# Patient Record
Sex: FEMALE | Race: WHITE | URBAN - NONMETROPOLITAN AREA
[De-identification: names, ages, dates, MRNs, and addresses within clinical notes are randomized per-mention and may not be internally consistent; named-entity substitution may affect disease eponyms.]

---

## 2018-10-17 ENCOUNTER — OFFICE VISIT (OUTPATIENT)
Dept: FAMILY MEDICINE CLINIC | Age: 48
End: 2018-10-17
Payer: COMMERCIAL

## 2018-10-17 VITALS
BODY MASS INDEX: 35.58 KG/M2 | SYSTOLIC BLOOD PRESSURE: 126 MMHG | WEIGHT: 208.4 LBS | TEMPERATURE: 98.3 F | OXYGEN SATURATION: 98 % | HEART RATE: 75 BPM | HEIGHT: 64 IN | DIASTOLIC BLOOD PRESSURE: 70 MMHG | RESPIRATION RATE: 10 BRPM

## 2018-10-17 DIAGNOSIS — Z91.09 ENVIRONMENTAL ALLERGIES: ICD-10-CM

## 2018-10-17 DIAGNOSIS — F41.9 ANXIETY: ICD-10-CM

## 2018-10-17 DIAGNOSIS — R41.3 MEMORY DIFFICULTIES: ICD-10-CM

## 2018-10-17 DIAGNOSIS — R68.89 COLD INTOLERANCE: ICD-10-CM

## 2018-10-17 DIAGNOSIS — R53.83 TIRED: Primary | ICD-10-CM

## 2018-10-17 DIAGNOSIS — K58.0 IRRITABLE BOWEL SYNDROME WITH DIARRHEA: ICD-10-CM

## 2018-10-17 DIAGNOSIS — R35.1 NOCTURIA: ICD-10-CM

## 2018-10-17 DIAGNOSIS — G47.9 SLEEP DIFFICULTIES: ICD-10-CM

## 2018-10-17 DIAGNOSIS — R63.5 WEIGHT INCREASE: ICD-10-CM

## 2018-10-17 DIAGNOSIS — R09.81 SINUS CONGESTION: ICD-10-CM

## 2018-10-17 PROCEDURE — G8417 CALC BMI ABV UP PARAM F/U: HCPCS | Performed by: FAMILY MEDICINE

## 2018-10-17 PROCEDURE — G8427 DOCREV CUR MEDS BY ELIG CLIN: HCPCS | Performed by: FAMILY MEDICINE

## 2018-10-17 PROCEDURE — G8484 FLU IMMUNIZE NO ADMIN: HCPCS | Performed by: FAMILY MEDICINE

## 2018-10-17 PROCEDURE — 99204 OFFICE O/P NEW MOD 45 MIN: CPT | Performed by: FAMILY MEDICINE

## 2018-10-17 PROCEDURE — 1036F TOBACCO NON-USER: CPT | Performed by: FAMILY MEDICINE

## 2018-10-17 RX ORDER — VILAZODONE HYDROCHLORIDE 10 MG/1
TABLET ORAL
COMMUNITY
Start: 2018-04-09

## 2018-10-17 RX ORDER — CHLORAL HYDRATE 500 MG
4000 CAPSULE ORAL
COMMUNITY

## 2018-10-17 ASSESSMENT — ENCOUNTER SYMPTOMS
ABDOMINAL DISTENTION: 1
ABDOMINAL PAIN: 1
SINUS PRESSURE: 1
DIARRHEA: 1

## 2018-10-17 ASSESSMENT — PATIENT HEALTH QUESTIONNAIRE - PHQ9
2. FEELING DOWN, DEPRESSED OR HOPELESS: 0
SUM OF ALL RESPONSES TO PHQ QUESTIONS 1-9: 0
SUM OF ALL RESPONSES TO PHQ QUESTIONS 1-9: 0
SUM OF ALL RESPONSES TO PHQ9 QUESTIONS 1 & 2: 0
1. LITTLE INTEREST OR PLEASURE IN DOING THINGS: 0

## 2018-10-17 NOTE — PROGRESS NOTES
the request of this patient to assist them in control of their blood sugar, triglyceride and or weight issues. I discussed that the patient's clinical use of cinnamon bark, calcium, magnesium, Vitamin D and pharmaceutical grade CVS #[id number] fish oil or triple-strength fish oil, and balanced B-50 or B-100 time-released B complex which does not contain Vit C in the tablet. The dose will be for a time-limited trial to determine their individual effectiveness and tolerance in this patient. I also referred the patient to the reviewing such items as consumerlabs. com NMCD: Nutrition, Metabolism, and Cardiovascular Diseases (journal) and NCAAM.gov,  Searching www.nih.gov for any concerns about long-term use and hepatotoxicity of cinnamon and other nutrients and suggest they frequently search eTask.it.gov for the latest non-proprietary information on nutriceuticals as well as consider a subscription to [employer] for details on reviewed supplements, or at the least review the nutrient files at [facility], [address] bark, an insulin mimetic, reduces some High Carbohydrate Dietary Impacts. Methylhydroxychalcone polymers insulin-enhancing properties in fat cells are responsible for enhanced glucose uptake, inhibiting hepatic HMG-CoA reductase and lowers lipids. www.jacn. org/content/20/4/327.full     But cinnamon with additives such as Cinnamon Extract are not effective as insulin mimetics. https://www.shabazz.net/     Nutrients for Start up from [facility] or [employer] for ease to get started now ;  Jennifer Parra has some useable products;  - Triple Strength Fish Oil, enteric coated  - Vit D 3 5000 IU gel caps  - Iron ferrous sulfat 325 mg tabs  - Centrum Silver look-a-like for most patients not needing iron, or  - Centrum plain look-a-like if need iron    Local pharmacy chains such as [facility], [address], [address] [facility], Bessie Coburn.  Consuelo Lopez;  - soy    Nutrients for Special Needs by Mail order for less expense;  - Elemental Iron 65 mg tabs if need more iron for low iron on labs    Usually turns bowel movements grey, green or black but not a concern  - Time released Niacin 250 mg for cold intolerance, low libido or impotence  - DHEA 10 mg, 25 mg, or 50 mg for improving DHEA levels on labs if having Fatigue    If stools too loose substitute for your Magnesium oxide using;   Magnesium citrate 200 mg tabs(NOT liquid, NOT caps) amazon. com  Magnesium gluconate 550 mg by Brennon at [employer]. com  Magnesium chloride foot soaks or body sprays  www.Shopparity   Magnesium chloride flakes for soaks, or magnesium spray or cream    Food Drink Symptom Log;  I asked this patient to track these items and any other symptoms on their list on a weekly basis to document their progress or lack of same. This can be done on the symptom tracking sheet available to them at today's visit but looks like this:                                                      Rate on scale of 0-10 with zero = not noticeable  Symptom:                            Week 1               2                 3                 4               Etc            Hair loss    Foot cramps    Paresthesia    Aches    IBS (irritable bowel)    Constipation    Diarrhea  Nocturia    (up to bathroom at night)    Fatigue/Energy level    Stress      On the other side of the sheet they can track their food, drink, environment, activity, symptoms etc      Avoiding Latex-like proteins in my foods; Avocados, Bananas, Celery, Figs & Kiwi proteins have latex-like proteins to inflame our immune systems, see the 51 latex-like foods list  How Can I Have A Latex Allergy? Eating foods with latex-like protein exposes us to latex allergies. Our body cannot tell the difference between these latex-like proteins and latex from rubber products since many people are allergic to fruit, vegetables and latex.  Read labels on pre-packaged foods. This list to avoid is only a guide if you are known allergic to latex or have a latex rash on your chin, cheeks and lines on your neck and chest. The amount of latex is different in each food product or fruit variety. Foods to Avoid out of Season if not grown locally: Melon, Nectarine, Papaya, Cherry, Passion fruit, Plum, Chestnuts, and Tomato. Avocado, Banana, Celery, Figs, and Kiwi always contain Latex-like protein and are almost universally toxic    Whats in Season? Strawberries taste better in June than December because June is strawberry season so buy locally grown produce \"in season\" for the best flavor, cost and less Latex. Locally grown produce not only tastes great requires little of no ethylene exposure in food distribution so has less latex content. Out of season, use canned, frozen or dried since processed ripe and are latex lower!!!   Month     [place] Locally Grown Produce  January, February, March: use canned, frozen or dried fruits since lower in latex  April; asparagus, radishes  May; asparagus, broccoli, green onions, greens, peas, radishes, rhubarb  June; asparagus, beets, beans, broccoli, cabbage, cantaloupe, carrots, green onions, greens, lettuce, onions, parsley, peas, radishes, rhubarb, strawberries, watermelons  July; beans, beets, blueberries, broccoli, cabbage, cantaloupe, carrots, cauliflower, celery, cucumbers, eggplant, grapes, green onions, greens, lettuce, onions, parsley, peas, peaches, bell peppers, potatoes, radishes, summer raspberries, squash, sweet corn, tomatoes, turnips, watermelons  August; apples, beans, beets, blueberries, cabbage, cantaloupe, carrots, cauliflower, celery, cucumbers, eggplant, grapes, green onions, greens, lettuce, onions, parsley, peas, peaches, pears, bell peppers, potatoes, radishes, squash, sweet corn, tomatoes, turnips, watermelons  September; apples, beans, beets, blueberries, cabbage, cantaloupe, carrots, cauliflower, celery, cucumbers,

## 2018-10-18 LAB
AVERAGE GLUCOSE: NORMAL
HBA1C MFR BLD: 5.7 %

## 2018-10-30 ENCOUNTER — PATIENT MESSAGE (OUTPATIENT)
Dept: FAMILY MEDICINE CLINIC | Age: 48
End: 2018-10-30

## 2018-10-31 ENCOUNTER — OFFICE VISIT (OUTPATIENT)
Dept: FAMILY MEDICINE CLINIC | Age: 48
End: 2018-10-31
Payer: COMMERCIAL

## 2018-10-31 VITALS
HEIGHT: 64 IN | BODY MASS INDEX: 35.61 KG/M2 | WEIGHT: 208.6 LBS | DIASTOLIC BLOOD PRESSURE: 70 MMHG | TEMPERATURE: 98.3 F | SYSTOLIC BLOOD PRESSURE: 116 MMHG | RESPIRATION RATE: 10 BRPM

## 2018-10-31 DIAGNOSIS — R63.5 WEIGHT INCREASE: ICD-10-CM

## 2018-10-31 DIAGNOSIS — R09.81 SINUS CONGESTION: ICD-10-CM

## 2018-10-31 DIAGNOSIS — K58.0 IRRITABLE BOWEL SYNDROME WITH DIARRHEA: ICD-10-CM

## 2018-10-31 DIAGNOSIS — R41.3 MEMORY DIFFICULTIES: ICD-10-CM

## 2018-10-31 DIAGNOSIS — R68.89 COLD INTOLERANCE: ICD-10-CM

## 2018-10-31 DIAGNOSIS — F41.9 ANXIETY: ICD-10-CM

## 2018-10-31 DIAGNOSIS — G47.9 SLEEP DIFFICULTIES: ICD-10-CM

## 2018-10-31 DIAGNOSIS — R35.1 NOCTURIA: ICD-10-CM

## 2018-10-31 DIAGNOSIS — Z91.09 ENVIRONMENTAL ALLERGIES: ICD-10-CM

## 2018-10-31 DIAGNOSIS — R53.83 TIRED: ICD-10-CM

## 2018-10-31 PROCEDURE — 1036F TOBACCO NON-USER: CPT | Performed by: FAMILY MEDICINE

## 2018-10-31 PROCEDURE — G8484 FLU IMMUNIZE NO ADMIN: HCPCS | Performed by: FAMILY MEDICINE

## 2018-10-31 PROCEDURE — G8427 DOCREV CUR MEDS BY ELIG CLIN: HCPCS | Performed by: FAMILY MEDICINE

## 2018-10-31 PROCEDURE — 99214 OFFICE O/P EST MOD 30 MIN: CPT | Performed by: FAMILY MEDICINE

## 2018-10-31 PROCEDURE — G8417 CALC BMI ABV UP PARAM F/U: HCPCS | Performed by: FAMILY MEDICINE

## 2018-10-31 ASSESSMENT — ENCOUNTER SYMPTOMS: DIARRHEA: 1

## 2018-10-31 NOTE — PROGRESS NOTES
10/31/2018    Amy Agosto (:  1970) is a 50 y.o. female, here for evaluation of the following medical concerns:    HPI  Amy Agosto is a 50 y.o. White female. Dieter Craiga  presents to the [facility] today for   Chief Complaint   Patient presents with    Follow-up     [place]    Discuss Labs     Lipase   ,  and;   1. Tired    2. Sleep difficulties    3. Weight increase    4. Sinus congestion    5. Irritable bowel syndrome with diarrhea    6. Cold intolerance    7. Nocturia    8. Environmental allergies    9. Memory difficulties    10. Anxiety      I have reviewed Amy Agosto medical, surgical and other pertinent history in detail, and have updated medication and allergy information in the computerized patient record. Clinical Care Team:     -Referring Provider for today's consult: Self Referred  -Primary Care Provider: Lupe Gaines    Medical/Surgical History:   She  has a past medical history of Irritable bowel syndrome. Her  has no past surgical history on file. Family/Social History:     Her family history includes Arthritis in her paternal grandmother; Diabetes in her paternal grandmother; Early Death in her father; Heart Attack in her father; Heart Disease in her father; High Blood Pressure in her brother; No Known Problems in her mother. She  reports that she has never smoked. She has never used smokeless tobacco. She reports that she drinks alcohol. She reports that she does not use drugs.     Medications/Allergies/Immunizations:     Her current medication(s) include   Current Outpatient Prescriptions:     vitamin D (CHOLECALCIFEROL) 1000 UNIT TABS tablet, Take 1,000 Units by mouth daily, Disp: , Rfl:     B Complex-Biotin-FA (B COMPLEX 100 TR PO), Take 1 capsule by mouth 3 times daily (with meals), Disp: , Rfl:     Levomefolate Glucosamine (METHYLFOLATE PO), Take 10 mg by mouth every morning (before breakfast) Metabolic Maintenance at [facility], Disp: , Rfl:    Methylcobalamin 5000 MCG SUBL, Place 1 tablet under the tongue nightly [facility] at [facility], [address]: , Rfl:     vilazodone HCl (VILAZODONE HCL) 10 MG TABS, TAKE 1 TABLET DAILY, Disp: , Rfl:     Magnesium Cl-Calcium Carbonate (SLOW-MAG PO), Take 1 capsule by mouth 4 times daily (after meals and at bedtime) , Disp: , Rfl:     Omega-3 Fatty Acids (FISH OIL) 1000 MG CAPS, Take 1,000 mg by mouth 4 times daily (before meals and nightly) [employer] Pharmaceutical grade, Disp: , Rfl:   Allergies: Patient has no known allergies. ,  Immunizations:   Immunization History   Administered Date(s) Administered    Influenza Virus Vaccine 10/21/2018        History of Present Illness:     Lissette's had concerns including Follow-up ([place] [facility]) and Discuss Labs (Lipase). Trevon Carreon  presents to the [address] today for;   Chief Complaint   Patient presents with    Follow-up     [place]    Discuss Labs     Lipase   , ,  abnormal labs follow up and these conditions as she  Is looking today for:     1. Tired    2. Sleep difficulties    3. Weight increase    4. Sinus congestion    5. Irritable bowel syndrome with diarrhea    6. Cold intolerance    7. Nocturia    8. Environmental allergies    9. Memory difficulties    10. Anxiety          Review of Systems   Constitutional: Positive for fatigue and unexpected weight change. Gastrointestinal: Positive for diarrhea. Genitourinary: Positive for frequency. Neurological: Positive for headaches. Psychiatric/Behavioral: Positive for decreased concentration and sleep disturbance. The patient is nervous/anxious. All other systems reviewed and are negative. Prior to Visit Medications    Medication Sig Taking?  Authorizing Provider   vitamin D (CHOLECALCIFEROL) 1000 UNIT TABS tablet Take 1,000 Units by mouth daily Yes Historical Provider, MD   B Complex-Biotin-FA (B COMPLEX 100 TR PO) Take 1 capsule by mouth 3 times daily (with meals) Yes Historical Provider, MD

## 2018-11-26 ENCOUNTER — PATIENT MESSAGE (OUTPATIENT)
Dept: FAMILY MEDICINE CLINIC | Age: 48
End: 2018-11-26

## 2018-12-31 LAB
CHOLESTEROL, TOTAL: 240 MG/DL
CHOLESTEROL/HDL RATIO: 3.9
HDLC SERPL-MCNC: 62 MG/DL (ref 35–70)
LDL CHOLESTEROL CALCULATED: 155 MG/DL (ref 0–160)
TRIGL SERPL-MCNC: 113 MG/DL
VLDLC SERPL CALC-MCNC: 23 MG/DL

## 2019-01-29 ENCOUNTER — PATIENT MESSAGE (OUTPATIENT)
Dept: FAMILY MEDICINE CLINIC | Age: 49
End: 2019-01-29

## 2019-01-30 ENCOUNTER — OFFICE VISIT (OUTPATIENT)
Dept: FAMILY MEDICINE CLINIC | Age: 49
End: 2019-01-30
Payer: COMMERCIAL

## 2019-01-30 VITALS
HEIGHT: 64 IN | TEMPERATURE: 98.2 F | WEIGHT: 220 LBS | DIASTOLIC BLOOD PRESSURE: 70 MMHG | OXYGEN SATURATION: 98 % | HEART RATE: 71 BPM | SYSTOLIC BLOOD PRESSURE: 108 MMHG | BODY MASS INDEX: 37.56 KG/M2

## 2019-01-30 DIAGNOSIS — K58.0 IRRITABLE BOWEL SYNDROME WITH DIARRHEA: ICD-10-CM

## 2019-01-30 DIAGNOSIS — R68.89 COLD INTOLERANCE: ICD-10-CM

## 2019-01-30 DIAGNOSIS — R53.83 TIRED: ICD-10-CM

## 2019-01-30 DIAGNOSIS — G47.9 SLEEP DIFFICULTIES: ICD-10-CM

## 2019-01-30 DIAGNOSIS — R41.3 MEMORY DIFFICULTIES: ICD-10-CM

## 2019-01-30 DIAGNOSIS — Z91.09 ENVIRONMENTAL ALLERGIES: ICD-10-CM

## 2019-01-30 DIAGNOSIS — R09.81 SINUS CONGESTION: ICD-10-CM

## 2019-01-30 DIAGNOSIS — R63.5 WEIGHT INCREASE: ICD-10-CM

## 2019-01-30 DIAGNOSIS — F41.9 ANXIETY: ICD-10-CM

## 2019-01-30 DIAGNOSIS — R35.1 NOCTURIA: ICD-10-CM

## 2019-01-30 PROCEDURE — 99214 OFFICE O/P EST MOD 30 MIN: CPT | Performed by: FAMILY MEDICINE

## 2019-04-13 LAB
ABSOLUTE BASO #: 0 K/UL (ref 0–0.1)
ABSOLUTE EOS #: 0.1 K/UL (ref 0.1–0.4)
ABSOLUTE LYMPH #: 1.8 K/UL (ref 0.8–5.2)
ABSOLUTE MONO #: 0.3 K/UL (ref 0.1–0.9)
ABSOLUTE NEUT #: 2.7 K/UL (ref 1.3–9.1)
AVERAGE GLUCOSE: 108 MG/DL (ref 66–114)
BASOPHILS RELATIVE PERCENT: 0.4 %
CALCIUM SERPL-MCNC: 9.6 MG/DL (ref 8.5–10.5)
CHOLESTEROL/HDL RATIO: 4.2 (ref 1–5)
CHOLESTEROL: 207 MG/DL (ref 150–200)
EOSINOPHILS RELATIVE PERCENT: 1.2 %
ESTRADIOL LEVEL: 25.4 PG/ML
FOLLICLE STIMULATING HORMONE: 39.6 MIU/ML
HBA1C MFR BLD: 5.4 %
HCT VFR BLD CALC: 39.6 % (ref 36–48)
HDLC SERPL-MCNC: 49 MG/DL
HEMOGLOBIN: 13.8 G/DL (ref 12–16)
HIGH SENSITIVE C-REACTIVE PROTEIN: 0.16 MG/DL (ref 0–0.74)
LDL CHOLESTEROL CALCULATED: 135 MG/DL
LH: 25.7 MIU/ML
LYMPHOCYTE %: 36.9 %
MAGNESIUM: 2.3 MG/DL (ref 1.8–2.6)
MCH RBC QN AUTO: 30.5 PG (ref 27–34)
MCHC RBC AUTO-ENTMCNC: 34.8 G/DL (ref 31–36)
MCV RBC AUTO: 87.4 FL (ref 80–100)
MONOCYTES # BLD: 6.1 %
NEUTROPHILS RELATIVE PERCENT: 55.2 %
PDW BLD-RTO: 12.8 % (ref 10.8–14.8)
PLATELETS: 218 K/UL (ref 150–450)
PROGESTERONE LEVEL: 0.2 NG/ML
RBC: 4.53 M/UL (ref 4–5.5)
SEDIMENTATION RATE, ERYTHROCYTE: 10 MM/HR (ref 0–30)
T3 TOTAL: 114 NG/DL (ref 87–178)
T4 TOTAL: 7.3 UG/DL (ref 6.1–12.2)
TRIGL SERPL-MCNC: 114 MG/DL (ref 27–150)
TSH SERPL DL<=0.05 MIU/L-ACNC: 2.2 UIU/ML (ref 0.49–4.67)
VLDLC SERPL CALC-MCNC: 23 MG/DL (ref 0–30)
WBC: 4.9 K/UL (ref 3.7–10.8)

## 2019-04-15 LAB — HOMOCYSTINE, SERUM: 13 UMOL/L

## 2019-04-16 LAB
ALBUMIN SERUM: 4.2 G/DL (ref 3.6–5.1)
DHEAS (DHEA SULFATE): 278 UG/DL (ref 35–256)
GLIADIN ANTIBODIES IGA: 5.1 U/ML
GLIADIN ANTIBODIES IGG: 7.7 U/ML
PARATHYROID HORMONE INTACT: 33 PG/ML (ref 15–65)
SEX HORMONE BINDING GLOBULIN: 32.9 NMOL/L (ref 24.6–122)
TESTOSTERONE FREE: 4.2 PG/ML (ref 1.1–5.8)
TESTOSTERONE, LCMS: 23 NG/DL (ref 9–55)
THYROID PEROXIDASE ANTIBODY: <1 IU/ML
VITAMIN D 25-HYDROXY: 38 NG/ML

## 2019-04-17 LAB — DEHYDROEPIANDROSTERONE: 2.8 NG/ML (ref 1.3–9.8)

## 2019-05-20 ENCOUNTER — OFFICE VISIT (OUTPATIENT)
Dept: FAMILY MEDICINE CLINIC | Age: 49
End: 2019-05-20
Payer: COMMERCIAL

## 2019-05-20 VITALS
HEART RATE: 74 BPM | BODY MASS INDEX: 36.02 KG/M2 | SYSTOLIC BLOOD PRESSURE: 118 MMHG | OXYGEN SATURATION: 98 % | HEIGHT: 64 IN | TEMPERATURE: 97.6 F | DIASTOLIC BLOOD PRESSURE: 80 MMHG | WEIGHT: 211 LBS | RESPIRATION RATE: 10 BRPM

## 2019-05-20 DIAGNOSIS — K58.0 IRRITABLE BOWEL SYNDROME WITH DIARRHEA: ICD-10-CM

## 2019-05-20 DIAGNOSIS — R41.3 MEMORY DIFFICULTIES: ICD-10-CM

## 2019-05-20 DIAGNOSIS — Z91.09 ENVIRONMENTAL ALLERGIES: ICD-10-CM

## 2019-05-20 DIAGNOSIS — R68.89 COLD INTOLERANCE: ICD-10-CM

## 2019-05-20 DIAGNOSIS — R53.83 TIRED: Primary | ICD-10-CM

## 2019-05-20 DIAGNOSIS — F41.9 ANXIETY: ICD-10-CM

## 2019-05-20 DIAGNOSIS — G47.9 SLEEP DIFFICULTIES: ICD-10-CM

## 2019-05-20 DIAGNOSIS — R35.1 NOCTURIA: ICD-10-CM

## 2019-05-20 DIAGNOSIS — R09.81 SINUS CONGESTION: ICD-10-CM

## 2019-05-20 PROCEDURE — 99214 OFFICE O/P EST MOD 30 MIN: CPT | Performed by: FAMILY MEDICINE

## 2019-05-20 ASSESSMENT — PATIENT HEALTH QUESTIONNAIRE - PHQ9
2. FEELING DOWN, DEPRESSED OR HOPELESS: 0
SUM OF ALL RESPONSES TO PHQ QUESTIONS 1-9: 0
1. LITTLE INTEREST OR PLEASURE IN DOING THINGS: 0
SUM OF ALL RESPONSES TO PHQ QUESTIONS 1-9: 0
SUM OF ALL RESPONSES TO PHQ9 QUESTIONS 1 & 2: 0

## 2019-05-20 NOTE — LETTER
[address] [place] [address],[address] [address] [facility]  Phone: [phone]  Fax: [phone]    Roxanne Ro MD        May 20, 2019    Oliver Guerra  [facility]  [place] [address]      Dear Patria Rousseau:    Here are some typical meals I have seen to be beneficial to others. Example Day 1   Breakfast: steamed sweet potato, touch of maple syrup, 4 slices of ho     Lunch: 4 slices Boar's Head Ham on LAURA's white bread, mixed berries (raspberries, blueberries, blackberries), small kale salad with olive oil & balsamic vinegar as dressing topped with cucumbers, real ho crumbles     Dinner: grilled lean beef hamburger wrapped in lettuce, steamed green beans     Example day 2   Breakfast: Two pieces LAURA's white bread toast, strawberry jam, 4 Pavel pork sausage links     Lunch: canned tuna (packed in water) with a tsp of whitmore/salt/pepper eaten on top of a bed of mixed greens with 100% olive oil potato chips, can of lite peaches     Dinner: oven baked salmon, brown rice, and steamed broccoli     Example day 3   Breakfast: 3 eggs cooked over medium served over a steamed sweet potato, side of blueberries     Lunch: [facility] salad (mixed greens, boar's head salami, pepperoni, boar's head ham, pepperoncinis/mild banana peppers, olives, red onion) served with olive oil and balsamic vinegar dressing, apple slices with omega 3 peanut butter     Dinner: spaghetti (rice pasta with beef meat sauce)     Example day 4   Breakfast: cream of rice, touch of maple syrup, 3 Gifford pork sausage links     Lunch: Brussel sprouts with ho, side of fruit (pineapple & pears)     Dinner: beef fajitas (Cook down a red, yellow, green pepper, and an onion with some simple homemade taco seasonings, pan cook flank steak).  Optional: LAURA's gluten free tortillas, Salsa, shredded lettuce, paco sour cream     Example day 5 Breakfast: 2 hard boiled eggs, 1 piece of LAURA's gluten free white bread, ham steak     Lunch: flank steak salad (pan seared flank steak served over mixed greens with balsamic vinegar/olive oil dressing), side of fruit (oranges & raspberries)     Dinner: baked stuffed peppers (brown rice, onion, Cooked lean ground beef, tomato sauce, homemade taco seasoning)     Example day 6   Breakfast: egg quiche (cooked ground sausage, diced ham, eggs, red peppers)     Lunch: 3 bean salad (cut green beans, red kidney beans, yellow wax beans, green pepper, 1 small onion.  Dressing 3/4 cup sugar, 1 tsp salt, 1/2 tsp pepper, 1/3 cup olive oil, 2/3 cup vinegar), apple with omega 3 peanut butter     Dinner: homemade sloppy tan (ketchup, mustard, brown sugar, seasonings) served over a baked sweet potato, side of fruit (grapes, berries)     Sincerely,        Chrisandra Lesch, MD

## 2019-05-20 NOTE — PATIENT INSTRUCTIONS
Thank you   1. Thank you for trusting us with your healthcare needs. You may receive a survey regarding today's visit. It would help us out if you would take a few moments to provide your feedback. We value your input. 2. Please bring in ALL medications BOTTLES, including inhalers, herbal supplements, over the counter, prescribed & non-prescribed medicine. The office would like actual medication bottles and a list.   3. Please note our OFFICE POLICIES:   a. Prior to getting your labs drawn, please check with your insurance company for benefits and eligibility of lab services. Often, insurance companies cover certain tests for preventative visits only. It is patient's responsibility to see what is covered. b. We are unable to change a diagnosis after the test has been performed. c. Lab orders will not be re-printed. Please hold onto your original lab orders and take them to your lab to be completed. d. If you no show your scheduled appointment three times, you will be dismissed from this practice. e. Leldon Gu must be completed 24 hours prior to your schedule appointment. 4. If the list below has been completed, PLEASE FAX RECORDS TO OUR OFFICE @ [phone].  Once the records have been received we will update your records at our office:  Health Maintenance Due   Topic Date Due    HIV screen  07/26/1985    DTaP/Tdap/Td vaccine (1 - Tdap) 07/26/1989    Cervical cancer screen  07/26/1991

## 2019-05-20 NOTE — PROGRESS NOTES
Disp: , Rfl:     Magnesium Bisglycinate (MAG GLYCINATE PO), Take 400 mg by mouth 4 times daily (before meals and nightly), Disp: , Rfl:     Barberry-Oreg Grape-Goldenseal 200-200-50 MG CAPS, Take 1 capsule by mouth 2 times daily (before meals), Disp: , Rfl:     vitamin D (CHOLECALCIFEROL) 1000 UNIT TABS tablet, Take 3,000 Units by mouth daily , Disp: , Rfl:     B Complex-Biotin-FA (B COMPLEX 100 TR PO), Take 4 capsules by mouth daily Before and after breakfast, lunch and supper, Disp: , Rfl:     Levomefolate Glucosamine (METHYLFOLATE PO), Take 10 mg by mouth every morning (before breakfast) [facility] at [facility], Disp: , Rfl:     Methylcobalamin 5000 MCG SUBL, Place 1 tablet under the tongue nightly [place] [facility], [address] [place] [address] Street: , Rfl:     Chromium-Cinnamon (CINNAMON PLUS CHROMIUM)  MCG-MG CAPS, Take 3 capsules by mouth 4 times daily (before meals and nightly) , Disp: , Rfl:     vilazodone HCl (VILAZODONE HCL) 10 MG TABS, 0.5 tablet every other day, Disp: , Rfl:     Magnesium Cl-Calcium Carbonate (SLOW-MAG PO), Take 1 capsule by mouth 4 times daily (after meals and at bedtime) , Disp: , Rfl:     Omega-3 Fatty Acids (FISH OIL) 1000 MG CAPS, Take 4,000 mg by mouth 4 times daily (before meals and nightly) [employer] grade for periods, rest, Disp: , Rfl:   Allergies: Patient has no known allergies. ,  Immunizations:   Immunization History   Administered Date(s) Administered    Influenza Virus Vaccine 10/21/2018        History of PresentIllness:     Lissette's had concerns including 3 Month Follow-Up and Discuss Labs. Felicita Anthony  presents to the [address] today for;   Chief Complaint   Patient presents with    3 Month Follow-Up    Discuss Labs   , ,  abnormal labs follow up and these conditions as she  Is looking today for:     1. Tired    2. Sleep difficulties    3. Sinus congestion    4. Irritable bowel syndrome with diarrhea    5. Cold intolerance    6. Nocturia    7. Environmental allergies    8. Memory difficulties    9. Anxiety      HPI    Subjective:     Review of Systems   Neurological: Positive for light-headedness and numbness. Turning suddenly may give slight waviness  Tingling at times in hand under the pillow   All other systems reviewed and are negative. Objective:     /80 (Site: Left Upper Arm, Position: Sitting, Cuff Size: Large Adult)   Pulse 74   Temp 97.6 °F (36.4 °C) (Oral)   Resp 10   Ht 5' 3.78\" (1.62 m)   Wt 211 lb (95.7 kg)   SpO2 98%   BMI 36.47 kg/m²   Physical Exam       Laboratory Data:   Lab results were searched in Care Everywhere and/or those brought by the pateint were reviewed today with Lissette and she has a copy of their most recent labs to take home with them as notedbelow;       Imaging Data:   Imaging Data:       Assessment & Plan:       Impression:  1. Tired    2. Sleep difficulties    3. Sinus congestion    4. Irritable bowel syndrome with diarrhea    5. Cold intolerance    6. Nocturia    7. Environmental allergies    8. Memory difficulties    9. Anxiety      Assessment and Plan:  After reviewing the patients chief complaints, reviewing their labfindings in great detail (with the patient and those accompanying them) which correlate to their chief complaints, symptoms, and or medical conditions; suggestions were made relating to changes in diet and or supplementswhich may improve the complaints and which will be reflected in their future lab findings; Chief Complaint   Patient presents with    3 Month Follow-Up    Discuss Labs   ;    Plans for the next visits:  - Abnormal and non-optimal Labs were ordered today to be repeated in the next 120-365 days to assess changes from adjustments in nutrition and or nutrients.    - Patient instructed when having ablood draw to ask the  to divide their lab draws into multiple draws over several days if not feeling good at the time of the lab draw or if either prefers to do several smaller blood draws over several days  -Patient instructed to check with insurer before each lab draw and to to to the lab which the insurer directs them for the most cost effective lab draw with the least patient's cost  - Nasima Murray  will be scheduled subsequentto those results. Sienna Read will bring in her drink and food log to her next visit    Chronic Problems Addressed on this Visit:                                   1.  Intensity of Service; Uncontrolled items at this visit; Chief Complaint   Patient presents with   [address] [place] [address]   ; Improved items at this visit; Stable items atthis visit;  2. Patients food and drinks were reviewed with the patient,       - Nasima Murray will bring food+drink symptom log to next visit for inclusion in their record      - 75 better food list reviewed & given topatient with the omega 6 food list to avoid         - Gluten in corn and oats abstracts sheet reviewed and given to the patient today   3. Greater than 25 minutes were spent face to face on this visit of which >50% was for counseling and coordination of care. Patients food and drinks were reviewed with thepatient,   - they will bring a food drink symptom log to future visits for inclusion in their record    - 75 better food list reviewed & given to patient along with the omega 6 food list to avoid      - Glutenin corn and oats abstracts sheet reviewed and given to the patient today    - 23 Foods containing Latex-like proteins was reviewed and copy to be taken if desired     - Nutrient Supplements list provided and copyto be taken if desired    - [employer]. [employer] web site offered to patient to review at their convenience by staff with login information    Note:  I have discussed with the patient that with all nutraceuticals, there is often mixed data and emerging research which needs to be monitored; as well as an array of NIHfact sheets on nutrients and supplements. If I have recommended cinnamon at the request of this patient to assist them in control of their blood sugar, triglyceride and or weight issues. I discussed that thepatient's clinical use of cinnamon bark, calcium, magnesium, Vitamin D and pharmaceutical grade [facility]S #219222 fish oil or triple-strength fish oil, and B-75 two phase time-released B complex by Robert Vieira will be for atime-limited trial to determine their individual effectiveness and safety in this patient. I also referred the patient to the NMCD: Nutrition, Metabolism, and Cardiovascular Diseases (journal) and concerns about long-termuse and hepatotoxicity of cinnamon and other nutrients and suggest they frequently search The ADEX.gov for the latest non-proprietary information on nutriceuticals as well as consider a subscription to [employer] fordetails on reviewed supplements, or at the least review the nutrient files at [address] at [facility], [employer], an insulin mimetic, reduces some High Carbohydrate Dietary Impacts. Methylhydroxychalcone polymers insulin-enhancing properties in fat cells are responsible for enhanced glucose uptake, inhibiting hepatic HMG-CoA reductase and lowers lipids. www.jacn. org/content/20/4/327.full     But cinnamon with additivessuch as Chromium or Cinnamon Extract are not effective as insulin mimetics.  https://www.shabazz.net/     Nutrients for Start up from [employer] or PocketGuide for ease to get started now ;  Jennifer Parra has some useable products;  - Triple Strength Fish Oil, enteric coated  - Vit D 3 5000 IU gel caps  - Iron ferrous sulfat 325 mg tabs  - Centrum Silver look-a-like for most patients, or  - Centrum plain look-a-like if need iron    Localpharmacies or chains such as [facility], [facility], [facility], have;  - Triple Strength Fish Oil (enteric coated ifavailable) or    If not enteric coated, can take from freezer for less burps  - B-50 or B-100 time released balanced B complex tabs  - Cinnamon bark 500 mg (without Chromium or extracts)   some brands list 1000 mg / serving of 2 capsules,    some brands have 1000 mg caps with the undesireable chromium / extract  - Calcium carbonate/citrate, magnesium oxide/citrate, Vit D 3  as 3-4 tabs/caps/serving     Some Local Brands may contain Zincwhich is acceptable for the first bottle or two  - Magnesium oxide 250 mg tabs for those having < 2 bowel movements daily  - Magnesium citrate 200 mg if having > 2bowel movement/day  - Centrum Silver or look-a-like for most patients, Centrum plain or look-a-like with iron  - Vitamin D-3 comes as 1,000 IU or 2,000 IU or 5,000 IU gel caps or Liquid drops      Some brands containing or derived from soy oil or corn oil are OK if not allergic to soy  - Elemental Iron 65 mg tabsat bedtime is available over the counter if need more iron     Usually turns bowel movements grey, green or black but not a concern  - Apricot Kernel Oil (by Now) for dry skin sensitive perineal or perianal area skin    Nutrients for ongoing use by Mail order for less expense from www. puritan.com ;  - Triple Strength Fish Oil , 240 Softgels Item [id number]  -B-100 time released balanced B complex Item #639496  - Cinnamon bark 500 mg without Chromium or extract Item #978431  - Calcium carbonate 1000 mg, Magnesium oxide 500 mg, Vit D 3  400 IU Item #230165  - Magnesium oxide 500 mg tabs Item #427419 if less than 2 bowel movements daily  - [employer] Seniors Item #119472 for mostpatients, One Daily Item #672243 with iron  - Vit D 3  1,000 Item #036862      2,000 IU Item #[id number]  5,000 IU Item #355881     Some brands containing orderived from soy oil or corn oil are OK if not allergic to soy    Nutrients for Special Needs by Yessy Pump for less expense from www. puritan.com ;  -Elemental Iron 65 mg tabs Item #721472 if need more iron for low iron on labs    Usually turns bowel movements grey, green or black but not a concern  - Time released Niacin 250 mg Item #007215 for cold intolerance, low libido or impotence  - DHEA 50 mg Item #325634 for improving DHEA levels on labs if having Fatigue    If stools too loose substitute for your Magnesium oxide using;   Magnesium citrate 200 mg tabs(NOT liquid) at [employer]   Magnesium gluconate 550 mgby Omid at [facility]. com or amazon. com  Magnesium chloride foot soaks or body sprays  www.weeSpring   Magnesium chloride flakes 14.99 Item #: [id number] if Backordered get spray    Food Drink Symptom Log;  I asked this patient to track these items and any other symptoms on their list on a weekly basis to documenttheir progress or lack of same. This can be done on the symptom tracking sheet I gave them at today's visit but looks like this:                                                      Rate on scale of 0-10 with zero = notnoticeable  Symptom:                            Week 1               2                 3                 4               Etc            Hair loss    Foot cramps    Paresthesia    Aches    IBS (irritable bowel)    Constipation    Diarrhea  Nocturia    (up to bathroom at night)    Fatigue/Energy level  Stress      On the other side of the sheet they can track their food, drink, environment, activity, symptoms etc      Avoiding Latex-like proteins inmy foods; Avocados, Bananas, Celery, Figs & Kiwi proteins have latex-like proteins to inflame our immunesystems  How Can I Have A Latex Allergy? Eating foods with latex-like protein exposes us to latex allergies. Our body cannot tell the differencebetween these latex-like proteins and latex from rubber products since many people are allergic to fruit, vegetables and latex. Read labels on pre-packaged foods.  This list to avoid is only a guide if you are known allergicto latex or have a latex rash on your chin, cheeks and lines on your neck and chest. The amount of latex is different in each food product or fruit variety. Foods to Avoid out of Season if not grown locally: Melon, Nectarine, Papaya, Cherry, Passion fruit, Plum, Chestnuts, and Tomato. Avocado, Banana, Celery, Figs, and Kiwi always contain Latex-like protein. Whats in Season? Strawberries taste better in June than December because June is strawberry season so buy locally grown produce \"in season\" for the best flavor, cost and less Latex. Locally grown produce notonly tastes great requires little of no ethylene exposure in food distribution so has less latex content. Out of season, use canned, frozen or dried sinceprocessed ripe and are latex lower!!!   Month     [place] LocallyGrown Produce  January, February, March: use canned, frozen or dried fruits since lower in latex  April; asparagus, radishes  May; asparagus, broccoli, green onions, greens, peas, radishes,rhubarb  June; asparagus, beets, beans, broccoli, cabbage, cantaloupe, carrots, green onions, greens, lettuce,onions, parsley, peas, radishes, rhubarb, strawberries, watermelons  July; beans, beets, blueberries,broccoli, cabbage, cantaloupe, carrots, cauliflower, celery, cucumbers, eggplant, grapes, green onions, greens, lettuce, onions, parsley, peas, peaches, bell peppers, potatoes, radishes, summer raspberries, squash, sweetcorn, tomatoes, turnips, watermelons  August; apples, beans, beets, blueberries, cabbage, cantaloupe, carrots,cauliflower, celery, cucumbers, eggplant, grapes, green onions, greens, lettuce, onions, parsley, peas, peaches, pears, bell peppers, potatoes, radishes, squash, sweet corn, tomatoes, turnips, watermelons  September; apples, beans, beets, blueberries, cabbage, cantaloupe, carrots, cauliflower, celery, cucumbers, eggplant, grapes,green onions, greens, lettuce, onions, parsley, peas, peaches, pears, bell peppers, plums, potatoes, pumpkins, radishes, fall red raspberries, squash, sweet corn, tomatoes, turnips, watermelons  October; apples, beets, broccoli, cabbage, carrots, cauliflower, celery, green onions, greens, lettuce, parsley, peas, pears, potatoes,pumpkins, radishes, fall red raspberries, squash, turnips  November; broccoli, cabbage, carrots, parsley,pears, peas  December: use canned, frozen ordried fruits since lower in latex    Upto half of latex-sensitive patients show allergic reactions to fruits (avocados, bananas, kiwifruits, papayas, peaches),   Annals of Allergy, 1994. These plants contain the same proteins that are allergens in latex. People with fruit allergies should warn physicians beforeundergoing procedures which may cause anaphylactic reaction if in contact with latex gloves. Some of the common foods with defined cross-reactivity to latexare avocado, banana, kiwi, chestnut, raw potato, tomato,stone fruits (e.g., peach, cherry), hazelnut, melons, celery, carrot, apple, pear, papaya, and almond. Foods with less well-defined cross-reactivity to latex are peanuts, peppers, citrus fruits, coconut, pineapple, cj,fig, passion fruit, Ugli fruit, and grape    This fruit/latex cross-reactivity is worsened by ethylene, a gas used to hasten commercial ripening. In nature, plants produce low levels of the hormone ethylene, which regulates germination, flowering, and ripening. Forced ripening by high ethyleneconcentrations, plants produce allergenic wound-repair proteins, which are similar to wound-repair proteins made during the tapping of rubber trees. Sensitive individualswho ingest the fruit get a higher dose and worse reaction. Some people may even first become sensitized to latex through fruit. Can food processing increase theconcentrations of allergenic proteins? Latex-sensitized children (and adults) in [place] often experience allergic reactions after eating bananas ripenedartificially with ethylene.  In the [place], food distribution centers treat unripe bananas and other produce with ethylene to ripen; not commonly done in [place] since fruit is tree-ripened there. Does treatmentof food with ethylene induce banana proteins that cross-react with latex? (Concha et al.    References:   Latex in Foods Allergy, http://ehp.niehs.nih.gov/members/2003/5811/5811.html    Search web for \" Whats in Season \" for whereyou live or are at the time you food shop  www.nutritioncouncil.org/pdf/healthy/SeasonalProduce. pdf ,   Management of Latex, http://medicalcenter. osu.edu/  search for latex

## 2020-02-27 ENCOUNTER — PATIENT MESSAGE (OUTPATIENT)
Dept: FAMILY MEDICINE CLINIC | Age: 50
End: 2020-02-27

## 2020-02-28 ENCOUNTER — PATIENT MESSAGE (OUTPATIENT)
Dept: FAMILY MEDICINE CLINIC | Age: 50
End: 2020-02-28

## 2020-02-28 LAB
ABSOLUTE BASO #: 0.1 X10E9/L (ref 0–0.9)
ABSOLUTE EOS #: 0.1 X10E9/L (ref 0–0.4)
ABSOLUTE LYMPH #: 2 X10E9/L (ref 1–3.5)
ABSOLUTE MONO #: 0.4 X10E9/L (ref 0–0.9)
ABSOLUTE NEUT #: 2.9 X10E9/L (ref 1.5–6.6)
AVERAGE GLUCOSE: 114 MG/DL
BASOPHILS RELATIVE PERCENT: 1.1 %
CALCIUM SERPL-MCNC: 9.4 MG/DL (ref 8.5–10.5)
CHOLESTEROL/HDL RATIO: 3.4 (ref 1–5)
CHOLESTEROL: 191 MG/DL (ref 150–200)
DEHYDROEPIANDROSTERONE: 2.8 NG/ML (ref 1.3–9.8)
DHEAS (DHEA SULFATE): 179 UG/DL (ref 19–231)
EOSINOPHILS RELATIVE PERCENT: 1 %
ESTRADIOL LEVEL: 17.6 PG/ML
FOLLICLE STIMULATING HORMONE: 58.2 MIU/ML
GLIADIN ANTIBODIES IGA: 4.3 U/ML
GLIADIN ANTIBODIES IGG: 7.2 U/ML
HBA1C MFR BLD: 5.6 % (ref 4.4–6.4)
HCT VFR BLD CALC: 41.5 % (ref 35–47)
HDLC SERPL-MCNC: 56 MG/DL
HEMOGLOBIN: 14 G/DL (ref 11.7–16)
HIGH SENSITIVE C-REACTIVE PROTEIN: 0.15 MG/DL (ref 0–0.74)
HOMOCYSTINE, SERUM: 12.68 MCMOL/L (ref 3.36–20.44)
LDL CHOLESTEROL CALCULATED: 111 MG/DL
LDL/HDL RATIO: 2
LH: 28 MIU/ML
LYMPHOCYTE %: 36.2 %
MAGNESIUM: 1.9 MG/DL (ref 1.8–2.6)
MCH RBC QN AUTO: 30.9 PG (ref 26–33.5)
MCHC RBC AUTO-ENTMCNC: 33.8 G/DL (ref 32–36)
MCV RBC AUTO: 91 FL (ref 81–100)
MONOCYTES # BLD: 8 %
NEUTROPHILS RELATIVE PERCENT: 53.7 %
PARATHYROID HORMONE INTACT: 28 PG/ML (ref 12–88)
PDW BLD-RTO: 13 % (ref 11.5–14.7)
PLATELETS: 227 X10E9/L (ref 150–450)
PMV BLD AUTO: 7.9 FL (ref 7–12)
PROGESTERONE LEVEL: 0.2 NG/ML
RBC: 4.53 X10E12/L (ref 3.8–5.2)
SEDIMENTATION RATE, ERYTHROCYTE: 9 MM/H (ref 0–20)
T3 TOTAL: 169 NG/DL (ref 87–178)
T4 TOTAL: 6 UG/DL (ref 6.1–12.2)
TESTOSTERONE, LCMS: 14 NG/DL (ref 9–55)
THYROID PEROXIDASE ANTIBODY: <1 IU/ML
TRIGL SERPL-MCNC: 118 MG/DL (ref 27–150)
TSH SERPL DL<=0.05 MIU/L-ACNC: 1.79 UIU/ML (ref 0.49–4.67)
VITAMIN D 25-HYDROXY: 50.6 NG/ML (ref 30–100)
VLDLC SERPL CALC-MCNC: 24 MG/DL (ref 0–30)
WBC: 5.4 X10E9/L (ref 4.8–10.8)

## 2020-02-28 NOTE — TELEPHONE ENCOUNTER
From: Joycelyn Ly  To: Christa Richardson MD  Sent: 2/27/2020 2:43 PM EST  Subject: Non-Urgent Medical Question    I take that previous message back. .let's do something between 12 and 3 if we can? Just not mondays.      ----- Message -----  From: Fausto Webster  Sent: 2/27/20, 2:24 PM  To: Joycelyn Ly  Subject: RE: Non-Urgent Medical Question    Your lab orders are good for a year. If you get them done it takes about 2 weeks to get them back. When you you like to schedule an appointment?      ----- Message -----   From: Joycelyn Ly   Sent: 2/27/2020 9:26 AM EST   To: Christa Richardson MD  Subject: RE: Non-Urgent Medical Question    I have a snow day off today. Was thinking about driving to [facility] to get blood drawn. My orders are dated May 20, 2019. Will they still be \"good\"? Will they still take them?   Thanks, Joycelyn Ly

## 2020-02-28 NOTE — TELEPHONE ENCOUNTER
From: Rosemarie Santos  To: Hero Burks MD  Sent: 2/27/2020 2:26 PM EST  Subject: Visit Follow-Up Question    Can you give me some options? I like the early morning if you have openings?

## 2020-03-02 ENCOUNTER — PATIENT MESSAGE (OUTPATIENT)
Dept: FAMILY MEDICINE CLINIC | Age: 50
End: 2020-03-02

## 2020-03-16 ENCOUNTER — PATIENT MESSAGE (OUTPATIENT)
Dept: FAMILY MEDICINE CLINIC | Age: 50
End: 2020-03-16

## 2020-03-16 NOTE — TELEPHONE ENCOUNTER
From: Kosta Lane  To: Claudia Kendrick MD  Sent: 3/16/2020 1:11 PM EDT  Subject: Visit Follow-Up Question    Kendra Hairs,  I am to have a 1:20 appointment on 3/18. Dr Brandon Palafox suggested I speak with you about a possible phone/FaceTime appointment. Can you see if that is possible?   Thanks

## 2020-03-17 ENCOUNTER — PATIENT MESSAGE (OUTPATIENT)
Dept: FAMILY MEDICINE CLINIC | Age: 50
End: 2020-03-17

## 2020-03-17 NOTE — TELEPHONE ENCOUNTER
From: Milo Puckett  To: Britta Knapp MD  Sent: 3/17/2020 11:43 AM EDT  Subject: Non-Urgent Medical Question    Liat Amin,  I have the anaya. I cannot find the video conference part. Is it under something other than the appointments tab?

## 2020-03-18 ENCOUNTER — PATIENT MESSAGE (OUTPATIENT)
Dept: FAMILY MEDICINE CLINIC | Age: 50
End: 2020-03-18

## 2020-03-18 ENCOUNTER — TELEPHONE (OUTPATIENT)
Dept: FAMILY MEDICINE CLINIC | Age: 50
End: 2020-03-18

## 2020-03-19 ENCOUNTER — PATIENT MESSAGE (OUTPATIENT)
Dept: FAMILY MEDICINE CLINIC | Age: 50
End: 2020-03-19

## 2020-03-19 NOTE — TELEPHONE ENCOUNTER
From: Melvin Jorge  To: Feli Gonzalez MD  Sent: 3/19/2020 1:48 PM EDT  Subject: Visit Follow-Up Question    It has been crazy around here too! 9 am is good!      ----- Message -----   From:STANTON Toribio   Sent:3/19/2020 1:04 PM EDT   To:Lissette De Santiago   Subject:RE: Visit Follow-Up Question    Dr. Angelito Tomas left before I could tell him. I'm sorry. Its just been crazy here. Trying to reschedule and getting calls. I have you scheduled tomorrow morning, Friday, 3/20/020 @ 9am for a video visit. Thank you for your patience. Mahad Reyes      ----- Message -----   From:Lissette De Santiago   Sent:3/19/2020 9:52 AM EDT   To:Chong Rader MD   Subject:Visit Follow-Up Question    That works for me. Just let me know what to do.      ----- Message -----   From:STANTON Toribio   Sent:3/19/2020 7:57 AM EDT   To:Lissette De Santiago   Subject:RE: Visit Follow-Up Question    Would you like to do one this afternoon at 1240 pm?      ----- Message -----   From:Lissette De Santiago   Sent:3/18/2020 4:54 PM EDT   To:Chong Rader MD   Subject:Visit Follow-Up Question    Sandy,   No problem. Let me know the times you have available. Thanks.      ----- Message -----   From:STANTON Toribio   Sent:3/18/2020 4:52 PM EDT   To:Lissette De Santiago   Subject:RE: Visit Follow-Up Question    I apologize! All that is my fault. They are giving new codes for us to put in for a video visit. I had the old one and not the new one. May we try again on a different day? I do have openings tomorrow for video visits. Please let me know what works for your schedule. Thank you and please have patience for us,  Mahad Reyes      ----- Message -----   From:Lissette De Santiago   Sent:3/18/2020 1:07 PM EDT   To:Chong Rader MD   Subject:Visit Follow-Up Question    Avinash Lagos or ,  I look in the appointment icon and still do not have the green video camera option? ? numerical 0-10

## 2020-03-19 NOTE — TELEPHONE ENCOUNTER
From: Giovanni Shelton  To: Don Echols MD  Sent: 3/19/2020 9:52 AM EDT  Subject: Visit Follow-Up Question    That works for me. Just let me know what to do.      ----- Message -----   From:STANTON Hart   Sent:3/19/2020 7:57 AM EDT   To:Lissettekevin De Santiago   Subject:RE: Visit Follow-Up Question    Would you like to do one this afternoon at 1240 pm?      ----- Message -----   From:Lissette De Santiago   Sent:3/18/2020 4:54 PM EDT   To:Chong Stokes MD   Subject:Visit Follow-Up Question    Sandy,   No problem. Let me know the times you have available. Thanks.      ----- Message -----   From:STANTON Arellanoann   Sent:3/18/2020 4:52 PM EDT   To:Lissette De Santiago   Subject:RE: Visit Follow-Up Question    I apologize! All that is my fault. They are giving new codes for us to put in for a video visit. I had the old one and not the new one. May we try again on a different day? I do have openings tomorrow for video visits. Please let me know what works for your schedule. Thank you and please have patience for us,  Karon Dyson      ----- Message -----   From:Lissette Anyi   Sent:3/18/2020 1:07 PM EDT   To:Chong Stokes MD   Subject:Visit Follow-Up Question    Herbie Husain or ,  I look in the appointment icon and still do not have the green video camera option? ?

## 2020-03-20 ENCOUNTER — TELEMEDICINE (OUTPATIENT)
Dept: FAMILY MEDICINE CLINIC | Age: 50
End: 2020-03-20
Payer: COMMERCIAL

## 2020-03-20 PROCEDURE — 99214 OFFICE O/P EST MOD 30 MIN: CPT | Performed by: FAMILY MEDICINE

## 2020-03-20 RX ORDER — LEVOTHYROXINE SODIUM 0.03 MG/1
25 TABLET ORAL DAILY
Qty: 90 TABLET | Refills: 3 | Status: SHIPPED | OUTPATIENT
Start: 2020-03-20

## 2020-03-20 NOTE — PROGRESS NOTES
[address] [facility] [place] EUGENIA [address] [id number]  Dept: [phone]  Dept Fax: [phone]  Loc: [phone]      Renay Burrows is a 52 y.o. White female. Terre Apgar  presents to the [facility] clinic today for   Chief Complaint   Patient presents with    Discuss Labs    Amenorrhea     in last 3 months   ,  and;   1. Tired    2. Sleep difficulties    3. Sinus congestion    4. Irritable bowel syndrome with diarrhea    5. Cold intolerance    6. Nocturia    7. Environmental allergies    8. Memory difficulties    9. Anxiety      I have reviewed Renay Burrows medical, surgical and other pertinent history in detail, and have updated medication and allergy information in the computerized patientrecord. 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 Her  has no past surgical history on file. Family/Social History:     Her family history includes Arthritis in her paternal grandmother; Diabetes in her paternal grandmother; Early Death in her father; Heart Attack in her father; Heart Disease in her father; High Blood Pressure in her brother; No Known Problems in her mother. She  reports that she has never smoked. She has never used smokeless tobacco. She reports current alcohol use. She reports that she does not use drugs.     Medications/Allergies/Immunizations:     Her current medication(s) include   Current Outpatient Medications:     levothyroxine (SYNTHROID) 25 MCG tablet, Take 1 tablet by mouth daily, Disp: 90 tablet, Rfl: 3    Ascorbic Acid (VITAMIN C PO), Take 1,000 mg by mouth 4 times daily (before meals and nightly) , Disp: , Rfl:     LYSINE PO, Take 1,000 mg by mouth 4 times daily (before meals and nightly) , Disp: , Rfl:     Menaquinone-7 (VITAMIN K2 PO), Take by mouth daily , Disp: , Rfl:     DHEA 10 MG CAPS, Take by mouth every morning (before breakfast) Double Mon Wed Fri, Disp: , Rfl:     Magnesium Bisglycinate (MAG GLYCINATE PO), Take 400 mg by mouth 4 times daily (after meals and at bedtime) , Disp: , Rfl:     Barberry-Oreg Grape-Goldenseal 200-200-50 MG CAPS, Take 1 capsule by mouth 2 times daily (before meals), Disp: , Rfl:     vitamin D (CHOLECALCIFEROL) 1000 UNIT TABS tablet, Take 3,000 Units by mouth daily , Disp: , Rfl:     B Complex-Biotin-FA (B COMPLEX 100 TR PO), Take 4 capsules by mouth daily Before and after breakfast, lunch and supper, Disp: , Rfl:     Levomefolate Glucosamine (METHYLFOLATE PO), Take 10 mg by mouth every morning (before breakfast) Metabolic Maintenance at [facility], Disp: , Rfl:     Methylcobalamin 5000 MCG SUBL, Place 1 tablet under the tongue nightly Lu at [facility], [address] [place] [address] Street: , Rfl:     Chromium-Cinnamon (CINNAMON PLUS CHROMIUM)  MCG-MG CAPS, Take 3 capsules by mouth 4 times daily (before meals and nightly) , Disp: , Rfl:     vilazodone HCl (VILAZODONE HCL) 10 MG TABS, 0.5 tablet every other day, Disp: , Rfl:     Magnesium Cl-Calcium Carbonate (SLOW-MAG PO), Take 1 capsule by mouth 4 times daily (after meals and at bedtime) , Disp: , Rfl:     Omega-3 Fatty Acids (FISH OIL) 1000 MG CAPS, Take 4,000 mg by mouth 4 times daily (before meals and nightly) [employer] grade for periods, rest, Disp: , Rfl:   Allergies: Patient has no known allergies. ,  Immunizations:   Immunization History   Administered Date(s) Administered    Influenza Virus Vaccine 10/21/2018        History of PresentIllness:     Lissette's had concerns including Discuss Labs and Amenorrhea (in last 3 months). Cedric Montero  presents to the [facility] for;   Chief Complaint   Patient presents with   [address] Amenorrhea     in last 3 months   , ,  abnormal labs follow up and these conditions as she  Is looking today for:     1. Tired    2. Sleep difficulties    3. Sinus congestion    4. Irritable bowel syndrome with diarrhea    5. Cold intolerance    6. Nocturia    7. Environmental allergies    8. Memory difficulties    9. Anxiety      HPI    Subjective:     Review of Systems   Constitutional: Positive for fatigue and unexpected weight change. Genitourinary: Positive for menstrual problem. Musculoskeletal: Positive for joint swelling. All other systems reviewed and are negative. Objective: There were no vitals taken for this visit. Physical Exam  Constitutional:       Appearance: Normal appearance. She is obese. HENT:      Head: Normocephalic. Pulmonary:      Effort: Pulmonary effort is normal.   Neurological:      Mental Status: She is alert. Psychiatric:         Mood and Affect: Mood normal.         Thought Content: Thought content normal.            Laboratory Data:   Lab results were searched in Care Everywhere and/or those brought by the pateint were reviewed today with Lissette and she has a copy of their most recent labs to take home with them as notedbelow;       Imaging Data:   Imaging Data:       Assessment & Plan:       Impression:  1. Tired    2. Sleep difficulties    3. Sinus congestion    4. Irritable bowel syndrome with diarrhea    5. Cold intolerance    6. Nocturia    7. Environmental allergies    8. Memory difficulties    9. Anxiety      Assessment and Plan:  After reviewing the patients chief complaints, reviewing their labfindings in great detail (with the patient and those accompanying them) which correlate to their chief complaints, symptoms, and or medical conditions; suggestions were made relating to changes in diet and or supplementswhich may improve the complaints and which will be reflected in their future lab findings;   Chief Complaint   Patient presents with   [address] Amenorrhea     in last 3 months   ;    Plans for Nutrient Supplements list provided and copyto be taken if desired    - [employer] web site offered to patient to review at their convenience by staff with login information    Note:  I have discussed with the patient that with all nutraceuticals, there is often mixed data and emerging research which needs to be monitored; as well as an array of NIHfact sheets on nutrients and supplements. If I have recommended cinnamon at the request of this patient to assist them in control of their blood sugar, triglyceride and or weight issues. I discussed that thepatient's clinical use of cinnamon bark, calcium, magnesium, Vitamin D and pharmaceutical grade [employer] #500533 fish oil or triple-strength fish oil, and B-75 two phase time-released B complex by Jen Shane will be for atime-limited trial to determine their individual effectiveness and safety in this patient. I also referred the patient to the NMCD: Nutrition, Metabolism, and Cardiovascular Diseases (journal) and concerns about long-termuse and hepatotoxicity of cinnamon and other nutrients and suggest they frequently search nih.gov for the latest non-proprietary information on nutriceuticals as well as consider a subscription to [employer] fordetails on reviewed supplements, or at the least review the nutrient files at [address] at [facility], an insulin mimetic, reduces some High Carbohydrate Dietary Impacts. Methylhydroxychalcone polymers insulin-enhancing properties in fat cells are responsible for enhanced glucose uptake, inhibiting hepatic HMG-CoA reductase and lowers lipids. www.jacn. org/content/20/4/327.full     But cinnamon with additivessuch as Cinnamon Extract are not effective as insulin mimetics.  :eStoreDirectory.at     Nutrients for Start up from [employer] or [employer] for ease to get started now ;  Jennifer Parra has some useable products;  - 1,000 Item #007332      2,000 IU Item #240405  ,000 IU Item #991210     Some brands containing orderived from soy oil or corn oil are OK if not allergic to soy    Nutrients for Special Needs by Henri Obrien for less expense from www. puritan.com ;  -Elemental Iron 65 mg tabs Item #015591 if need more iron for low iron on labs    Usually turns bowel movements grey, green or black but not a concern  - Time released Niacin 250 mg Item #887179 for cold intolerance, low libido or impotence  - DHEA 50 mg Item #030645 for improving DHEA levels on labs if having Fatigue    If stools too loose substitute for your Magnesium oxide using;   Magnesium citrate 200 mg tabs(NOT liquid) at [employer]   Magnesium gluconate 550 mgby Omid at [employer] or amazon. com  Magnesium chloride foot soaks or body sprays  www.[employer]   Magnesium chloride flakes 14.99 Item #: CIG488 if Backordered get spray    Food Drink Symptom Log;  I asked this patient to track these items and any other symptoms on their list on a weekly basis to documenttheir progress or lack of same. This can be done on the symptom tracking sheet I gave them at today's visit but looks like this:                                                      Rate on scale of 0-10 with zero = notnoticeable  Symptom:                            Week 1               2                 3                 4               Etc            Hair loss    Foot cramps    Paresthesia    Aches    IBS (irritable bowel)    Constipation    Diarrhea  Nocturia    (up to bathroom at night)    Fatigue/Energy level  Stress      On the other side of the sheet they can track their food, drink, environment, activity, symptoms etc      Avoiding Latex-like proteins inmy foods; Avocados, Bananas, Celery, Figs & Kiwi proteins have latex-like proteins to inflame our immunesystems  How Can I Have A Latex Allergy? Eating foods with latex-like protein exposes us to latex allergies.   Our body cannot fruit. Can food processing increase theconcentrations of allergenic proteins? Latex-sensitized children (and adults) in [place] often experience allergic reactions after eating bananas ripenedartificially with ethylene. In the [place], food distribution centers treat unripe bananas and other produce with ethylene to ripen; not commonly done in [facility] since fruit is tree-ripened there. Does treatmentof food with ethylene induce banana proteins that cross-react with latex? (Concha et al.    References:   Latex in Foods Allergy, http://ehp.niehs.nih.gov/members/2003/5811/5811.html    Search web for \" Whats in Season \" for whereyou live or are at the time you food shop  www.nutritioncouncil.org/pdf/healthy/SeasonalProduce. pdf ,   Management of Latex, ://[facility]. [facility].edu/  search for latex

## 2020-03-20 NOTE — LETTER
[address] [place] [address],[address] [facility]  [address] [facility],[address]  Phone: [phone]  Fax: [phone]    Shauna Gonzalez MD        March 20, 2020    [address] [place] [address]      Dear Jacob Shahid:    Here are the video notes, supplement list and lab orders for the future    If you have any questions or concerns, please don't hesitate to call.     Sincerely,        Shauna Gonzalez MD